# Patient Record
Sex: FEMALE | URBAN - NONMETROPOLITAN AREA
[De-identification: names, ages, dates, MRNs, and addresses within clinical notes are randomized per-mention and may not be internally consistent; named-entity substitution may affect disease eponyms.]

---

## 2023-08-07 ENCOUNTER — OFFICE VISIT (OUTPATIENT)
Dept: URBAN - NONMETROPOLITAN AREA CLINIC 2 | Facility: CLINIC | Age: 28
End: 2023-08-07
Payer: COMMERCIAL

## 2023-08-07 ENCOUNTER — LAB OUTSIDE AN ENCOUNTER (OUTPATIENT)
Dept: URBAN - NONMETROPOLITAN AREA CLINIC 2 | Facility: CLINIC | Age: 28
End: 2023-08-07

## 2023-08-07 ENCOUNTER — DASHBOARD ENCOUNTERS (OUTPATIENT)
Age: 28
End: 2023-08-07

## 2023-08-07 VITALS
HEIGHT: 60 IN | TEMPERATURE: 98.6 F | SYSTOLIC BLOOD PRESSURE: 145 MMHG | BODY MASS INDEX: 36.52 KG/M2 | DIASTOLIC BLOOD PRESSURE: 96 MMHG | WEIGHT: 186 LBS | HEART RATE: 91 BPM

## 2023-08-07 DIAGNOSIS — R10.13 EPIGASTRIC ABDOMINAL PAIN: ICD-10-CM

## 2023-08-07 DIAGNOSIS — R11.0 NAUSEA: ICD-10-CM

## 2023-08-07 DIAGNOSIS — K52.89 OTHER SPECIFIED NONINFECTIVE GASTROENTERITIS AND COLITIS: ICD-10-CM

## 2023-08-07 PROBLEM — 79922009: Status: ACTIVE | Noted: 2023-08-07

## 2023-08-07 PROBLEM — 329281000119107: Status: ACTIVE | Noted: 2023-08-07

## 2023-08-07 PROBLEM — 422587007: Status: ACTIVE | Noted: 2023-08-07

## 2023-08-07 PROBLEM — 43752006: Status: ACTIVE | Noted: 2023-08-07

## 2023-08-07 PROCEDURE — 99204 OFFICE O/P NEW MOD 45 MIN: CPT | Performed by: INTERNAL MEDICINE

## 2023-08-07 PROCEDURE — 99244 OFF/OP CNSLTJ NEW/EST MOD 40: CPT | Performed by: INTERNAL MEDICINE

## 2023-08-07 RX ORDER — ONDANSETRON 4 MG/1
TABLET, ORALLY DISINTEGRATING ORAL
Qty: 12 EACH | Status: ACTIVE | COMMUNITY

## 2023-08-07 RX ORDER — NITROFURANTOIN MONOHYDRATE/MACROCRYSTALLINE 25; 75 MG/1; MG/1
CAPSULE ORAL
Qty: 14 CAPSULE | Status: ACTIVE | COMMUNITY

## 2023-08-07 RX ORDER — PANTOPRAZOLE 40 MG/1
TAKE 1 TABLET (40 MG) BY ORAL ROUTE ONCE DAILY TABLET, DELAYED RELEASE ORAL ONCE A DAY
Qty: 90 TABLET | Refills: 3 | OUTPATIENT
Start: 2023-08-07

## 2023-08-07 RX ORDER — HYOSCYAMINE SULFATE 0.12 MG/1
1 TABLET AS NEEDED TABLET SUBLINGUAL
Qty: 90 TABLETS | Refills: 6 | OUTPATIENT
Start: 2023-08-07 | End: 2024-03-03

## 2023-08-07 RX ORDER — CIPROFLOXACIN 500 MG/1
TAKE 1 TABLET BY MOUTH TWICE A DAY FOR 7 DAYS TABLET ORAL
Qty: 14 EACH | Refills: 0 | Status: ACTIVE | COMMUNITY

## 2023-08-07 NOTE — HPI-TODAY'S VISIT:
8/7/2023 Anisa presents for evaluation of acute epigastric abdominal pain nausea vomiting and diarrhea.  She is referred by her primary care physician, copy of this note be sent to the referring physician.  2 weeks ago she developed acute epigastric abdominal pain with nausea vomiting and diarrhea that woke her from sleep.  She went to the emergency department on July 27 with enterocolitis involving her ileum and right colon.  She was also found to have a UTI, she was given Bactrim for antibiotics.  Her nausea vomiting and diarrhea did not improve.  She saw her primary care physician who ordered stool studies however these were not completed due to a lab error.  She was prescribed Cipro on a repeat visit to the emergency department.  She has multiple loose watery bowel movements daily, her eosinophil count is elevated on her CBC.  She does still have her gallbladder.  Her CT scan shows normal gallbladder and no gastric wall thickening.  She is never had an EGD or colonoscopy.  Her main complaint is epigastric abdominal pain with reported esophageal spasm status post second ED visit.  She is not taking anything for reflux.  She is status post gastric sleeve in April of this year.  Today she has multiple GI complaints.  I suspect she has acute infectious gastroenteritis/enterocolitis.  Complete the Cipro, proceed with stool studies given her persistent symptoms and elevated eos.  Start pantoprazole 40 mg daily for epigastric abdominal pain and Levsin for spasm.  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  MB

## 2023-08-09 LAB
C DIFFICILE TOXINS A+B, EIA: (no result)
CALPROTECTIN, FECAL: (no result)
CAMPYLOBACTER CULTURE: (no result)
DEAMIDATED GLIADIN ABS, IGA: 4
DEAMIDATED GLIADIN ABS, IGG: 3
E COLI SHIGA TOXIN EIA: (no result)
ENDOMYSIAL ANTIBODY IGA: NEGATIVE
GIARDIA LAMBLIA AG, EIA: (no result)
IMMUNOGLOBULIN A, QN, SERUM: 80
Lab: (no result)
OVA + PARASITE EXAM: (no result)
REQUEST PROBLEM: (no result)
REQUEST PROBLEM: (no result)
SALMONELLA/SHIGELLA SCREEN: (no result)
SPECIMEN STATUS REPORT: (no result)
T-TRANSGLUTAMINASE (TTG) IGA: <2
T-TRANSGLUTAMINASE (TTG) IGG: 4
WHITE BLOOD CELLS (WBC), STOOL: (no result)

## 2023-10-26 ENCOUNTER — OFFICE VISIT (OUTPATIENT)
Dept: URBAN - NONMETROPOLITAN AREA SURGERY CENTER 1 | Facility: SURGERY CENTER | Age: 28
End: 2023-10-26

## 2023-12-18 ENCOUNTER — TELEPHONE ENCOUNTER (OUTPATIENT)
Dept: URBAN - NONMETROPOLITAN AREA CLINIC 2 | Facility: CLINIC | Age: 28
End: 2023-12-18

## 2024-01-09 ENCOUNTER — OFFICE VISIT (OUTPATIENT)
Dept: URBAN - NONMETROPOLITAN AREA CLINIC 2 | Facility: CLINIC | Age: 29
End: 2024-01-09